# Patient Record
Sex: MALE | Race: WHITE | NOT HISPANIC OR LATINO | Employment: FULL TIME | URBAN - METROPOLITAN AREA
[De-identification: names, ages, dates, MRNs, and addresses within clinical notes are randomized per-mention and may not be internally consistent; named-entity substitution may affect disease eponyms.]

---

## 2020-12-21 PROCEDURE — U0004 COV-19 TEST NON-CDC HGH THRU: HCPCS | Performed by: PHYSICIAN ASSISTANT

## 2021-02-24 ENCOUNTER — LAB (OUTPATIENT)
Dept: LAB | Facility: HOSPITAL | Age: 41
End: 2021-02-24

## 2021-02-24 ENCOUNTER — OFFICE VISIT (OUTPATIENT)
Dept: ENDOCRINOLOGY | Facility: CLINIC | Age: 41
End: 2021-02-24

## 2021-02-24 VITALS
BODY MASS INDEX: 44.26 KG/M2 | OXYGEN SATURATION: 96 % | TEMPERATURE: 97.5 F | HEART RATE: 108 BPM | WEIGHT: 282 LBS | DIASTOLIC BLOOD PRESSURE: 74 MMHG | HEIGHT: 67 IN | SYSTOLIC BLOOD PRESSURE: 124 MMHG

## 2021-02-24 DIAGNOSIS — E23.0 HYPOGONADOTROPIC HYPOGONADISM (HCC): ICD-10-CM

## 2021-02-24 DIAGNOSIS — N62 GYNECOMASTIA: Primary | ICD-10-CM

## 2021-02-24 DIAGNOSIS — N62 GYNECOMASTIA: ICD-10-CM

## 2021-02-24 PROCEDURE — 83001 ASSAY OF GONADOTROPIN (FSH): CPT

## 2021-02-24 PROCEDURE — 84402 ASSAY OF FREE TESTOSTERONE: CPT

## 2021-02-24 PROCEDURE — 83002 ASSAY OF GONADOTROPIN (LH): CPT

## 2021-02-24 PROCEDURE — 84403 ASSAY OF TOTAL TESTOSTERONE: CPT

## 2021-02-24 PROCEDURE — 36415 COLL VENOUS BLD VENIPUNCTURE: CPT

## 2021-02-24 PROCEDURE — 82672 ASSAY OF ESTROGEN: CPT

## 2021-02-24 PROCEDURE — 99204 OFFICE O/P NEW MOD 45 MIN: CPT | Performed by: INTERNAL MEDICINE

## 2021-02-24 RX ORDER — IBUPROFEN 400 MG/1
400 TABLET ORAL EVERY 6 HOURS
COMMUNITY
End: 2021-05-06

## 2021-02-24 RX ORDER — OLMESARTAN MEDOXOMIL 40 MG/1
40 TABLET ORAL DAILY
COMMUNITY
End: 2022-11-17 | Stop reason: SDUPTHER

## 2021-02-24 RX ORDER — OMEPRAZOLE 20 MG/1
20 CAPSULE, DELAYED RELEASE ORAL DAILY
COMMUNITY
End: 2022-05-17

## 2021-02-24 RX ORDER — ROSUVASTATIN CALCIUM 40 MG/1
TABLET, COATED ORAL
COMMUNITY
Start: 2020-12-15

## 2021-02-24 RX ORDER — SILDENAFIL CITRATE 20 MG/1
20 TABLET ORAL 4 TIMES DAILY
COMMUNITY

## 2021-02-24 RX ORDER — FAMOTIDINE 20 MG/1
20 TABLET, FILM COATED ORAL DAILY
COMMUNITY
End: 2022-05-17

## 2021-02-24 RX ORDER — METOPROLOL SUCCINATE 50 MG/1
50 TABLET, EXTENDED RELEASE ORAL DAILY
COMMUNITY

## 2021-02-24 RX ORDER — LORATADINE 10 MG/1
10 TABLET ORAL
COMMUNITY

## 2021-02-24 NOTE — ASSESSMENT & PLAN NOTE
Has bilateral tender breasts. Suggests there might be too much estrogen. I suspect aromatization from testosterone in fat cells   Will check estrogen levels

## 2021-02-24 NOTE — ASSESSMENT & PLAN NOTE
Has clinical symptoms to suggest this and total testo was in the lower third of normal. Will check free level

## 2021-02-24 NOTE — PROGRESS NOTES
"     Office Note      Date: 2021  Patient Name: Terry Summers  MRN: [id number]  : 1980    Chief Complaint   Patient presents with   • Thyroid Problem   and breast pain    History of Present Illness:   Terry Summers is a 40 y.o. male who presents for Thyroid Problem  . And is euthyroid on meds.  But over the last year has  Gained 50 pounds. He notes breast swelling and tenderness. And decreased libido  tsh was normal.  testo was low normal  Cortisol was normal     Subjective      P  Review of Systems:   Review of Systems   Constitutional: Positive for fatigue and unexpected weight change.   Eyes: Negative.    Cardiovascular: Positive for chest pain.        Chest pain is actually breast pain   Endocrine: Negative.    Genitourinary: Negative.    Musculoskeletal: Negative.    Allergic/Immunologic: Negative.    Neurological: Negative.    Hematological: Negative.    Psychiatric/Behavioral: Negative.        The following portions of the patient's history were reviewed and updated as appropriate: allergies, current medications, past family history, past medical history, past social history, past surgical history and problem list.    Objective     Visit Vitals  /74 (BP Location: Left arm, Patient Position: Sitting, Cuff Size: Adult)   Pulse 108   Temp 97.5 °F (36.4 °C) (Infrared)   Ht 170.2 cm (67\")   Wt 128 kg (282 lb)   SpO2 96%   BMI 44.17 kg/m²           Physical Exam:  Physical Exam  Vitals signs reviewed.   Constitutional:       Appearance: Normal appearance. He is obese.   HENT:      Head: Normocephalic and atraumatic.      Mouth/Throat:      Mouth: Mucous membranes are moist.      Pharynx: Oropharynx is clear.   Eyes:      Extraocular Movements: Extraocular movements intact.      Pupils: Pupils are equal, round, and reactive to light.   Neck:      Musculoskeletal: Normal range of motion.      Comments: No goiter  Cardiovascular:      Rate and Rhythm: Normal rate and regular rhythm.      " Pulses: Normal pulses.      Heart sounds: No murmur.   Pulmonary:      Effort: Pulmonary effort is normal. No respiratory distress.   Chest:      Chest wall: Tenderness present.   Musculoskeletal: Normal range of motion.         General: No swelling.   Lymphadenopathy:      Cervical: No cervical adenopathy.   Skin:     General: Skin is warm.   Neurological:      General: No focal deficit present.      Mental Status: He is alert.   Psychiatric:         Mood and Affect: Mood normal.         Thought Content: Thought content normal.         Judgment: Judgment normal.         Assessment / Plan      Assessment & Plan:  Problem List Items Addressed This Visit        Other    Gynecomastia - Primary    Current Assessment & Plan     Has bilateral tender breasts. Suggests there might be too much estrogen. I suspect aromatization from testosterone in fat cells   Will check estrogen levels          Relevant Orders    Estrogens, Total    Hypogonadotropic hypogonadism (CMS/HCC)    Current Assessment & Plan     Has clinical symptoms to suggest this and total testo was in the lower third of normal. Will check free level          Relevant Orders    Testosterone, Free, Total    FSH & LH           Jose Bernal MD   02/24/2021

## 2021-02-25 LAB
FSH SERPL-ACNC: 2.37 MIU/ML
LH SERPL-ACNC: 2.95 MIU/ML

## 2021-02-27 LAB
TESTOST FREE SERPL-MCNC: 3 PG/ML (ref 6.8–21.5)
TESTOST SERPL-MCNC: 126 NG/DL (ref 264–916)

## 2021-03-01 LAB — ESTROGEN SERPL-MCNC: 124 PG/ML (ref 40–115)

## 2021-03-09 ENCOUNTER — TELEPHONE (OUTPATIENT)
Dept: ENDOCRINOLOGY | Facility: CLINIC | Age: 41
End: 2021-03-09

## 2021-03-09 NOTE — TELEPHONE ENCOUNTER
His labs shows high estrogen and low testosterone. I suspect that testosterone is being converted to estrogen in fat cells. I propose a trial of an aromatase inhibitor to try for 2 months to see if that helps. If he is willing to try that, I will send in an presciption and then he should follow up in 2 months

## 2021-03-09 NOTE — TELEPHONE ENCOUNTER
----- Message from Terry Summers sent at 3/9/2021 11:23 AM EST -----  Regarding: Test Results Question  Contact: [phone]  Hello,   I was wondering if I should make an appointment to follow-up with the recent test results, if I would need to get additional testing completed, or if I should wait for a phone call?  Thank You!  Osvaldo Summers

## 2021-03-09 NOTE — TELEPHONE ENCOUNTER
Patient notified.  He is agreeable to starting medication.  Uses [facility].  Appointment scheduled.

## 2021-03-10 RX ORDER — ANASTROZOLE 1 MG/1
1 TABLET ORAL DAILY
Qty: 30 TABLET | Refills: 5 | Status: SHIPPED | OUTPATIENT
Start: 2021-03-10 | End: 2021-08-03

## 2021-03-16 ENCOUNTER — APPOINTMENT (OUTPATIENT)
Dept: CT IMAGING | Facility: HOSPITAL | Age: 41
End: 2021-03-16

## 2021-03-16 ENCOUNTER — HOSPITAL ENCOUNTER (EMERGENCY)
Facility: HOSPITAL | Age: 41
Discharge: HOME | End: 2021-03-17
Attending: EMERGENCY MEDICINE | Admitting: EMERGENCY MEDICINE

## 2021-03-16 VITALS
DIASTOLIC BLOOD PRESSURE: 111 MMHG | RESPIRATION RATE: 18 BRPM | BODY MASS INDEX: 42.38 KG/M2 | TEMPERATURE: 99.4 F | HEIGHT: 67 IN | SYSTOLIC BLOOD PRESSURE: 157 MMHG | WEIGHT: 270 LBS | HEART RATE: 125 BPM | OXYGEN SATURATION: 94 %

## 2021-03-16 DIAGNOSIS — R10.30 LOWER ABDOMINAL PAIN: Primary | ICD-10-CM

## 2021-03-16 LAB
ALBUMIN SERPL-MCNC: 4.1 G/DL (ref 3.5–5.2)
ALBUMIN/GLOB SERPL: 1.3 G/DL
ALP SERPL-CCNC: 95 U/L (ref 39–117)
ALT SERPL W P-5'-P-CCNC: 132 U/L (ref 1–41)
ANION GAP SERPL CALCULATED.3IONS-SCNC: 10 MMOL/L (ref 5–15)
AST SERPL-CCNC: 154 U/L (ref 1–40)
BACTERIA UR QL AUTO: NORMAL /HPF
BASOPHILS # BLD AUTO: 0.06 10*3/MM3 (ref 0–0.2)
BASOPHILS NFR BLD AUTO: 0.7 % (ref 0–1.5)
BILIRUB SERPL-MCNC: 0.4 MG/DL (ref 0–1.2)
BILIRUB UR QL STRIP: NEGATIVE
BUN SERPL-MCNC: 15 MG/DL (ref 6–20)
BUN/CREAT SERPL: 12.2 (ref 7–25)
CALCIUM SPEC-SCNC: 9.9 MG/DL (ref 8.6–10.5)
CHLORIDE SERPL-SCNC: 101 MMOL/L (ref 98–107)
CK SERPL-CCNC: 240 U/L (ref 20–200)
CLARITY UR: CLEAR
CO2 SERPL-SCNC: 30 MMOL/L (ref 22–29)
COLOR UR: YELLOW
CREAT SERPL-MCNC: 1.23 MG/DL (ref 0.76–1.27)
D-LACTATE SERPL-SCNC: 1.7 MMOL/L (ref 0.5–2)
DEPRECATED RDW RBC AUTO: 37.3 FL (ref 37–54)
EOSINOPHIL # BLD AUTO: 0.39 10*3/MM3 (ref 0–0.4)
EOSINOPHIL NFR BLD AUTO: 4.8 % (ref 0.3–6.2)
ERYTHROCYTE [DISTWIDTH] IN BLOOD BY AUTOMATED COUNT: 13 % (ref 12.3–15.4)
GFR SERPL CREATININE-BSD FRML MDRD: 65 ML/MIN/1.73
GLOBULIN UR ELPH-MCNC: 3.2 GM/DL
GLUCOSE SERPL-MCNC: 118 MG/DL (ref 65–99)
GLUCOSE UR STRIP-MCNC: NEGATIVE MG/DL
HCT VFR BLD AUTO: 44 % (ref 37.5–51)
HGB BLD-MCNC: 15.1 G/DL (ref 13–17.7)
HGB UR QL STRIP.AUTO: ABNORMAL
HOLD SPECIMEN: NORMAL
HYALINE CASTS UR QL AUTO: NORMAL /LPF
IMM GRANULOCYTES # BLD AUTO: 0.02 10*3/MM3 (ref 0–0.05)
IMM GRANULOCYTES NFR BLD AUTO: 0.2 % (ref 0–0.5)
KETONES UR QL STRIP: NEGATIVE
LEUKOCYTE ESTERASE UR QL STRIP.AUTO: NEGATIVE
LIPASE SERPL-CCNC: 86 U/L (ref 13–60)
LYMPHOCYTES # BLD AUTO: 3.03 10*3/MM3 (ref 0.7–3.1)
LYMPHOCYTES NFR BLD AUTO: 37 % (ref 19.6–45.3)
MCH RBC QN AUTO: 28 PG (ref 26.6–33)
MCHC RBC AUTO-ENTMCNC: 34.3 G/DL (ref 31.5–35.7)
MCV RBC AUTO: 81.6 FL (ref 79–97)
MONOCYTES # BLD AUTO: 0.47 10*3/MM3 (ref 0.1–0.9)
MONOCYTES NFR BLD AUTO: 5.7 % (ref 5–12)
MYOGLOBIN SERPL-MCNC: 60 NG/ML (ref 28–72)
NEUTROPHILS NFR BLD AUTO: 4.22 10*3/MM3 (ref 1.7–7)
NEUTROPHILS NFR BLD AUTO: 51.6 % (ref 42.7–76)
NITRITE UR QL STRIP: NEGATIVE
NRBC BLD AUTO-RTO: 0 /100 WBC (ref 0–0.2)
PH UR STRIP.AUTO: <=5 [PH] (ref 5–8)
PLATELET # BLD AUTO: 294 10*3/MM3 (ref 140–450)
PMV BLD AUTO: 9 FL (ref 6–12)
POTASSIUM SERPL-SCNC: 3.8 MMOL/L (ref 3.5–5.2)
PROCALCITONIN SERPL-MCNC: 0.3 NG/ML (ref 0–0.25)
PROT SERPL-MCNC: 7.3 G/DL (ref 6–8.5)
PROT UR QL STRIP: NEGATIVE
RBC # BLD AUTO: 5.39 10*6/MM3 (ref 4.14–5.8)
RBC # UR: NORMAL /HPF
REF LAB TEST METHOD: NORMAL
SARS-COV-2 RNA RESP QL NAA+PROBE: NOT DETECTED
SODIUM SERPL-SCNC: 141 MMOL/L (ref 136–145)
SP GR UR STRIP: 1.02 (ref 1–1.03)
SQUAMOUS #/AREA URNS HPF: NORMAL /HPF
UROBILINOGEN UR QL STRIP: ABNORMAL
WBC # BLD AUTO: 8.19 10*3/MM3 (ref 3.4–10.8)
WBC UR QL AUTO: NORMAL /HPF
WHOLE BLOOD HOLD SPECIMEN: NORMAL
WHOLE BLOOD HOLD SPECIMEN: NORMAL

## 2021-03-16 PROCEDURE — 25010000002 IOPAMIDOL 61 % SOLUTION: Performed by: EMERGENCY MEDICINE

## 2021-03-16 PROCEDURE — 87040 BLOOD CULTURE FOR BACTERIA: CPT | Performed by: EMERGENCY MEDICINE

## 2021-03-16 PROCEDURE — U0003 INFECTIOUS AGENT DETECTION BY NUCLEIC ACID (DNA OR RNA); SEVERE ACUTE RESPIRATORY SYNDROME CORONAVIRUS 2 (SARS-COV-2) (CORONAVIRUS DISEASE [COVID-19]), AMPLIFIED PROBE TECHNIQUE, MAKING USE OF HIGH THROUGHPUT TECHNOLOGIES AS DESCRIBED BY CMS-2020-01-R: HCPCS | Performed by: EMERGENCY MEDICINE

## 2021-03-16 PROCEDURE — 83690 ASSAY OF LIPASE: CPT | Performed by: EMERGENCY MEDICINE

## 2021-03-16 PROCEDURE — 82550 ASSAY OF CK (CPK): CPT | Performed by: EMERGENCY MEDICINE

## 2021-03-16 PROCEDURE — 84145 PROCALCITONIN (PCT): CPT | Performed by: EMERGENCY MEDICINE

## 2021-03-16 PROCEDURE — 83605 ASSAY OF LACTIC ACID: CPT | Performed by: EMERGENCY MEDICINE

## 2021-03-16 PROCEDURE — 74177 CT ABD & PELVIS W/CONTRAST: CPT

## 2021-03-16 PROCEDURE — 85025 COMPLETE CBC W/AUTO DIFF WBC: CPT | Performed by: EMERGENCY MEDICINE

## 2021-03-16 PROCEDURE — 80053 COMPREHEN METABOLIC PANEL: CPT | Performed by: EMERGENCY MEDICINE

## 2021-03-16 PROCEDURE — 81001 URINALYSIS AUTO W/SCOPE: CPT | Performed by: EMERGENCY MEDICINE

## 2021-03-16 PROCEDURE — 83874 ASSAY OF MYOGLOBIN: CPT | Performed by: EMERGENCY MEDICINE

## 2021-03-16 PROCEDURE — 99283 EMERGENCY DEPT VISIT LOW MDM: CPT

## 2021-03-16 RX ORDER — SODIUM CHLORIDE 0.9 % (FLUSH) 0.9 %
10 SYRINGE (ML) INJECTION AS NEEDED
Status: DISCONTINUED | OUTPATIENT
Start: 2021-03-16 | End: 2021-03-17 | Stop reason: HOSPADM

## 2021-03-16 RX ORDER — DICYCLOMINE HCL 20 MG
20 TABLET ORAL EVERY 6 HOURS PRN
Qty: 20 TABLET | Refills: 0 | Status: SHIPPED | OUTPATIENT
Start: 2021-03-16 | End: 2021-05-06

## 2021-03-16 RX ADMIN — SODIUM CHLORIDE, POTASSIUM CHLORIDE, SODIUM LACTATE AND CALCIUM CHLORIDE 1000 ML: 600; 310; 30; 20 INJECTION, SOLUTION INTRAVENOUS at 22:11

## 2021-03-16 RX ADMIN — IOPAMIDOL 90 ML: 612 INJECTION, SOLUTION INTRAVENOUS at 22:57

## 2021-03-17 NOTE — ED PROVIDER NOTES
Subjective   Pt presents with lower abdominal pain.  Onset about two days ago.  Bandlike around lower abdomen radiating to the back>  Worse with movenent and breathing.  He has been a little sweaty and has mild nausea but no fever, chills, vomiting, diarrhea or urinary complaints.  He has not had recent antibiotics or exposure to untreated water.  He has never had an abdominal surgery.      History provided by:  Patient      Review of Systems   Constitutional: Negative for chills and fever.   Respiratory: Negative for shortness of breath.    Cardiovascular: Negative for chest pain.   Gastrointestinal: Positive for abdominal pain. Negative for diarrhea and vomiting.   Genitourinary: Negative for difficulty urinating and dysuria.   All other systems reviewed and are negative.      Past Medical History:   Diagnosis Date   • Asthma    • Depression    • Disease of thyroid gland    • Hyperlipidemia    • Hypertension        Allergies   Allergen Reactions   • Lisinopril Cough   • Sulfur Hives       Past Surgical History:   Procedure Laterality Date   • ADENOIDECTOMY     • DENTAL PROCEDURE     • LASIK     • NASAL SEPTUM SURGERY     • TONSILLECTOMY     • VASECTOMY         Family History   Problem Relation Age of Onset   • Diabetes Mother    • Obesity Mother    • Hypertension Father    • Hyperlipidemia Father    • Heart attack Father        Social History     Socioeconomic History   • Marital status:      Spouse name: Not on file   • Number of children: Not on file   • Years of education: Not on file   • Highest education level: Not on file   Tobacco Use   • Smoking status: Never Smoker   • Smokeless tobacco: Never Used   Vaping Use   • Vaping Use: Never used   Substance and Sexual Activity   • Alcohol use: Not Currently   • Drug use: Never   • Sexual activity: Defer           Objective   Physical Exam  Vitals and nursing note reviewed.   Constitutional:       General: He is not in acute distress.     Appearance: Normal  "appearance. He is obese. He is diaphoretic. He is not ill-appearing.   HENT:      Head: Normocephalic and atraumatic.      Mouth/Throat:      Mouth: Mucous membranes are moist.   Eyes:      General: No scleral icterus.        Right eye: No discharge.         Left eye: No discharge.      Conjunctiva/sclera: Conjunctivae normal.   Cardiovascular:      Rate and Rhythm: Normal rate and regular rhythm.      Heart sounds: No murmur.   Pulmonary:      Effort: Pulmonary effort is normal. No respiratory distress.      Breath sounds: Normal breath sounds. No wheezing.   Abdominal:      General: Bowel sounds are normal. There is no distension.      Palpations: Abdomen is soft.      Tenderness: There is abdominal tenderness in the right lower quadrant. There is no guarding or rebound.   Musculoskeletal:         General: No swelling. Normal range of motion.      Cervical back: Normal range of motion and neck supple.   Skin:     General: Skin is warm.      Findings: No rash.   Neurological:      General: No focal deficit present.      Mental Status: He is alert. Mental status is at baseline.   Psychiatric:         Mood and Affect: Mood normal.         Behavior: Behavior normal.         Thought Content: Thought content normal.         Procedures           ED Course         Labs, UA benign.  CT negative for acute process. No clear cause for symptoms found.    Patient stable on serial rechecks.  Discussed findings, concerns, plan of care, expected course, reasons to return and followup.  Provided the opportunity to ask questions.    Discussed \"watchful waiting\" over the next 2-3 days, monitoring for new or evolving symptoms which may help elucidate a cause, close outpatient followup, and return to ED for any concerning worsening or changing.                                    MDM  Number of Diagnoses or Management Options     Amount and/or Complexity of Data Reviewed  Clinical lab tests: reviewed and ordered  Tests in the radiology " section of CPT®: reviewed and ordered  Independent visualization of images, tracings, or specimens: yes        Final diagnoses:   Lower abdominal pain       ED Disposition  ED Disposition     ED Disposition Condition Comment    Discharge Stable           [facility] Emergency Department  [address] [address] [phone]    If symptoms worsen    Mi Masterson PA  [address] [address]  [phone]    In 2 days           Medication List      New Prescriptions    dicyclomine 20 MG tablet  Commonly known as: BENTYL  Take 1 tablet by mouth Every 6 (Six) Hours As Needed (pain).           Where to Get Your Medications      These medications were sent to [facility] [facility] - [phone]  - [phone]   [address] [place] [address]    Phone: [phone]   · dicyclomine 20 MG tablet          Velasquez Esteban MD  03/16/21 [id number]

## 2021-03-22 LAB
BACTERIA SPEC AEROBE CULT: NORMAL
BACTERIA SPEC AEROBE CULT: NORMAL

## 2021-05-06 ENCOUNTER — OFFICE VISIT (OUTPATIENT)
Dept: ORTHOPEDIC SURGERY | Facility: CLINIC | Age: 41
End: 2021-05-06

## 2021-05-06 VITALS
SYSTOLIC BLOOD PRESSURE: 145 MMHG | DIASTOLIC BLOOD PRESSURE: 101 MMHG | BODY MASS INDEX: 43.47 KG/M2 | HEART RATE: 90 BPM | WEIGHT: 277 LBS | HEIGHT: 67 IN

## 2021-05-06 DIAGNOSIS — M25.531 RIGHT WRIST PAIN: Primary | ICD-10-CM

## 2021-05-06 DIAGNOSIS — M65.4 RADIAL STYLOID TENOSYNOVITIS (DE QUERVAIN): ICD-10-CM

## 2021-05-06 PROCEDURE — 20550 NJX 1 TENDON SHEATH/LIGAMENT: CPT | Performed by: PHYSICIAN ASSISTANT

## 2021-05-06 PROCEDURE — 99204 OFFICE O/P NEW MOD 45 MIN: CPT | Performed by: PHYSICIAN ASSISTANT

## 2021-05-06 RX ADMIN — LIDOCAINE HYDROCHLORIDE 1 ML: 10 INJECTION, SOLUTION INFILTRATION; PERINEURAL at 15:07

## 2021-05-06 RX ADMIN — TRIAMCINOLONE ACETONIDE 20 MG: 40 INJECTION, SUSPENSION INTRA-ARTICULAR; INTRAMUSCULAR at 15:07

## 2021-05-10 RX ORDER — LIDOCAINE HYDROCHLORIDE 10 MG/ML
1 INJECTION, SOLUTION INFILTRATION; PERINEURAL
Status: COMPLETED | OUTPATIENT
Start: 2021-05-06 | End: 2021-05-06

## 2021-05-10 RX ORDER — TRIAMCINOLONE ACETONIDE 40 MG/ML
20 INJECTION, SUSPENSION INTRA-ARTICULAR; INTRAMUSCULAR
Status: COMPLETED | OUTPATIENT
Start: 2021-05-06 | End: 2021-05-06

## 2021-05-20 ENCOUNTER — LAB (OUTPATIENT)
Dept: LAB | Facility: HOSPITAL | Age: 41
End: 2021-05-20

## 2021-05-20 ENCOUNTER — OFFICE VISIT (OUTPATIENT)
Dept: ENDOCRINOLOGY | Facility: CLINIC | Age: 41
End: 2021-05-20

## 2021-05-20 VITALS
HEIGHT: 67 IN | BODY MASS INDEX: 44.39 KG/M2 | WEIGHT: 282.8 LBS | DIASTOLIC BLOOD PRESSURE: 84 MMHG | SYSTOLIC BLOOD PRESSURE: 130 MMHG

## 2021-05-20 DIAGNOSIS — N62 GYNECOMASTIA: ICD-10-CM

## 2021-05-20 DIAGNOSIS — N62 GYNECOMASTIA: Primary | ICD-10-CM

## 2021-05-20 PROCEDURE — 84402 ASSAY OF FREE TESTOSTERONE: CPT

## 2021-05-20 PROCEDURE — 99213 OFFICE O/P EST LOW 20 MIN: CPT | Performed by: INTERNAL MEDICINE

## 2021-05-20 PROCEDURE — 82672 ASSAY OF ESTROGEN: CPT

## 2021-05-20 PROCEDURE — 84403 ASSAY OF TOTAL TESTOSTERONE: CPT

## 2021-05-20 PROCEDURE — 36415 COLL VENOUS BLD VENIPUNCTURE: CPT

## 2021-05-20 RX ORDER — LIFITEGRAST 50 MG/ML
SOLUTION/ DROPS OPHTHALMIC AS NEEDED
COMMUNITY
Start: 2021-04-06 | End: 2022-05-17

## 2021-05-20 NOTE — PROGRESS NOTES
"     Office Note      Date: 2021  Patient Name: Terry Summers  MRN: [id number]  : 1980    Chief Complaint   Patient presents with   • Follow-up     gynemastia        History of Present Illness:   Terry Summers is a 40 y.o. male who presents for Follow-up (gynemastia )  he had high estrogen and low t. I put him on anastroxole. The breast tenderness is gone.  Energy is the same.  Libido about the same.  No change in need for viagra  No problems with the medication .    Subjective      .    Review of Systems:   Review of Systems   Constitutional: Positive for fatigue and unexpected weight change.       The following portions of the patient's history were reviewed and updated as appropriate: allergies, current medications, past family history, past medical history, past social history, past surgical history and problem list.    Objective     Visit Vitals  /84   Ht 170.2 cm (67\")   Wt 128 kg (282 lb 12.8 oz)   BMI 44.29 kg/m²       Labs:    CBC w/DIFF  Lab Results   Component Value Date    WBC 8.19 2021    RBC 5.39 2021    HGB 15.1 2021    HCT 44.0 2021    MCV 81.6 2021    MCH 28.0 2021    MCHC 34.3 2021    RDW 13.0 2021    RDWSD 37.3 2021    MPV 9.0 2021     2021    NEUTRORELPCT 51.6 2021    LYMPHORELPCT 37.0 2021    MONORELPCT 5.7 2021    EOSRELPCT 4.8 2021    BASORELPCT 0.7 2021    AUTOIGPER 0.2 2021    NEUTROABS 4.22 2021    LYMPHSABS 3.03 2021    MONOSABS 0.47 2021    EOSABS 0.39 2021    BASOSABS 0.06 2021    AUTOIGNUM 0.02 2021    NRBC 0.0 2021       T4  No results found for: FREET4    TSH  No results found for: TSHBASE     Physical Exam:  Physical Exam  Musculoskeletal:      Comments: No breast tenderness          Assessment / Plan      Assessment & Plan:  Problem List Items Addressed This Visit        Other    Gynecomastia - Primary    " Current Assessment & Plan     Clinically improved. I need to see his levels to guide therapy          Relevant Orders    Testosterone, Free, Total    Estrogens, Total           Jose Bernal MD   05/20/2021

## 2021-05-26 LAB
TESTOST FREE SERPL-MCNC: 7.1 PG/ML (ref 6.8–21.5)
TESTOST SERPL-MCNC: 527 NG/DL (ref 264–916)

## 2021-05-27 LAB — ESTROGEN SERPL-MCNC: 43 PG/ML (ref 40–115)

## 2021-08-03 RX ORDER — ANASTROZOLE 1 MG/1
TABLET ORAL
Qty: 90 TABLET | Refills: 0 | Status: SHIPPED | OUTPATIENT
Start: 2021-08-03 | End: 2021-10-20

## 2021-10-20 RX ORDER — ANASTROZOLE 1 MG/1
TABLET ORAL
Qty: 90 TABLET | Refills: 0 | Status: SHIPPED | OUTPATIENT
Start: 2021-10-20 | End: 2021-11-23 | Stop reason: SDUPTHER

## 2021-11-22 ENCOUNTER — OFFICE VISIT (OUTPATIENT)
Dept: ENDOCRINOLOGY | Facility: CLINIC | Age: 41
End: 2021-11-22

## 2021-11-22 ENCOUNTER — LAB (OUTPATIENT)
Dept: LAB | Facility: HOSPITAL | Age: 41
End: 2021-11-22

## 2021-11-22 VITALS
DIASTOLIC BLOOD PRESSURE: 80 MMHG | HEART RATE: 98 BPM | SYSTOLIC BLOOD PRESSURE: 124 MMHG | WEIGHT: 277 LBS | BODY MASS INDEX: 43.47 KG/M2 | HEIGHT: 67 IN | OXYGEN SATURATION: 96 %

## 2021-11-22 DIAGNOSIS — E23.0 HYPOGONADOTROPIC HYPOGONADISM (HCC): Primary | ICD-10-CM

## 2021-11-22 DIAGNOSIS — E23.0 HYPOGONADOTROPIC HYPOGONADISM (HCC): ICD-10-CM

## 2021-11-22 DIAGNOSIS — E03.9 ACQUIRED HYPOTHYROIDISM: ICD-10-CM

## 2021-11-22 PROBLEM — N62 GYNECOMASTIA: Status: RESOLVED | Noted: 2021-02-24 | Resolved: 2021-11-22

## 2021-11-22 LAB
ESTRADIOL SERPL HS-MCNC: <5 PG/ML
TESTOST SERPL-MCNC: 522 NG/DL (ref 249–836)
TSH SERPL DL<=0.05 MIU/L-ACNC: 0.36 UIU/ML (ref 0.27–4.2)

## 2021-11-22 PROCEDURE — 82670 ASSAY OF TOTAL ESTRADIOL: CPT

## 2021-11-22 PROCEDURE — 99214 OFFICE O/P EST MOD 30 MIN: CPT | Performed by: INTERNAL MEDICINE

## 2021-11-22 PROCEDURE — 84443 ASSAY THYROID STIM HORMONE: CPT

## 2021-11-22 PROCEDURE — 36415 COLL VENOUS BLD VENIPUNCTURE: CPT

## 2021-11-22 PROCEDURE — 84403 ASSAY OF TOTAL TESTOSTERONE: CPT

## 2021-11-22 RX ORDER — METFORMIN HYDROCHLORIDE 500 MG/1
500 TABLET, EXTENDED RELEASE ORAL 2 TIMES DAILY
COMMUNITY
Start: 2021-10-20 | End: 2022-11-17 | Stop reason: SDUPTHER

## 2021-11-22 RX ORDER — DEXTROAMPHETAMINE SACCHARATE, AMPHETAMINE ASPARTATE, DEXTROAMPHETAMINE SULFATE AND AMPHETAMINE SULFATE 2.5; 2.5; 2.5; 2.5 MG/1; MG/1; MG/1; MG/1
10 TABLET ORAL 2 TIMES DAILY
COMMUNITY
Start: 2021-11-11 | End: 2022-11-17

## 2021-11-22 NOTE — PROGRESS NOTES
"     Office Note      Date: 2021  Patient Name: Terry Summers  MRN: [id number]  : 1980    Chief Complaint   Patient presents with   • Hypogonadism       History of Present Illness:   Terry Summers is a 41 y.o. male who presents for Hypogonadism   he has high estrogen with low T.. presumably this is due to fat cells converting T to E.. I have him on an aromatase inhibitor- and last time his E was normal and his T was normal  He feels ok .  No breast pain or swelling.  He is due for a tsh check- he has primary hypothryoidism and is on T4 at 25 mcg per day. He has no symptoms     Subjective        Review of Systems:   Review of Systems   Constitutional: Positive for unexpected weight change.       The following portions of the patient's history were reviewed and updated as appropriate: allergies, current medications, past family history, past medical history, past social history, past surgical history and problem list.    Objective     Visit Vitals  /80   Pulse 98   Ht 170.2 cm (67\")   Wt 126 kg (277 lb)   SpO2 96%   BMI 43.38 kg/m²       Labs:    CBC w/DIFF  Lab Results   Component Value Date    WBC 8.19 2021    RBC 5.39 2021    HGB 15.1 2021    HCT 44.0 2021    MCV 81.6 2021    MCH 28.0 2021    MCHC 34.3 2021    RDW 13.0 2021    RDWSD 37.3 2021    MPV 9.0 2021     2021    NEUTRORELPCT 51.6 2021    LYMPHORELPCT 37.0 2021    MONORELPCT 5.7 2021    EOSRELPCT 4.8 2021    BASORELPCT 0.7 2021    AUTOIGPER 0.2 2021    NEUTROABS 4.22 2021    LYMPHSABS 3.03 2021    MONOSABS 0.47 2021    EOSABS 0.39 2021    BASOSABS 0.06 2021    AUTOIGNUM 0.02 2021    NRBC 0.0 2021       T4  No results found for: FREET4    TSH  No results found for: TSHBASE     Physical Exam:  Physical Exam  Vitals reviewed.   Constitutional:       Appearance: He is obese.   HENT:    "   Head: Normocephalic and atraumatic.   Neck:      Comments: No goiter  Lymphadenopathy:      Cervical: No cervical adenopathy.   Neurological:      Mental Status: He is alert.   Psychiatric:         Mood and Affect: Mood normal.         Thought Content: Thought content normal.         Judgment: Judgment normal.         Assessment / Plan      Assessment & Plan:  Problem List Items Addressed This Visit        Other    Hypogonadotropic hypogonadism (HCC) - Primary    Current Assessment & Plan     Stable. Will check labs and look to taper arimadex          Relevant Orders    Estradiol    Testosterone    Acquired hypothyroidism    Current Assessment & Plan     Clinically euthyroid. Thyroid levels ordered. Medication to be adjusted accordingly.         Relevant Medications    levothyroxine (SYNTHROID, LEVOTHROID) 25 MCG tablet    metoprolol succinate XL (TOPROL-XL) 50 MG 24 hr tablet    Other Relevant Orders    TSH           Jose Bernal MD   11/22/2021

## 2021-11-23 RX ORDER — LEVOTHYROXINE SODIUM 0.03 MG/1
25 TABLET ORAL DAILY
Qty: 90 TABLET | Refills: 3 | Status: SHIPPED | OUTPATIENT
Start: 2021-11-23 | End: 2022-11-17 | Stop reason: SDUPTHER

## 2021-11-23 RX ORDER — ANASTROZOLE 1 MG/1
TABLET ORAL
Qty: 45 TABLET | Refills: 1 | Status: SHIPPED | OUTPATIENT
Start: 2021-11-23 | End: 2022-01-04 | Stop reason: SDUPTHER

## 2022-01-04 RX ORDER — ANASTROZOLE 1 MG/1
TABLET ORAL
Qty: 45 TABLET | Refills: 1 | Status: SHIPPED | OUTPATIENT
Start: 2022-01-04 | End: 2022-01-12

## 2022-01-12 RX ORDER — ANASTROZOLE 1 MG/1
TABLET ORAL
Qty: 45 TABLET | Refills: 1 | Status: SHIPPED | OUTPATIENT
Start: 2022-01-12 | End: 2022-05-09

## 2022-05-09 RX ORDER — ANASTROZOLE 1 MG/1
TABLET ORAL
Qty: 90 TABLET | Refills: 1 | Status: SHIPPED | OUTPATIENT
Start: 2022-05-09 | End: 2022-10-25

## 2022-05-17 ENCOUNTER — OFFICE VISIT (OUTPATIENT)
Dept: ENDOCRINOLOGY | Facility: CLINIC | Age: 42
End: 2022-05-17

## 2022-05-17 ENCOUNTER — LAB (OUTPATIENT)
Dept: LAB | Facility: HOSPITAL | Age: 42
End: 2022-05-17

## 2022-05-17 VITALS
OXYGEN SATURATION: 97 % | SYSTOLIC BLOOD PRESSURE: 120 MMHG | WEIGHT: 265.6 LBS | HEART RATE: 102 BPM | DIASTOLIC BLOOD PRESSURE: 86 MMHG | HEIGHT: 67 IN | BODY MASS INDEX: 41.69 KG/M2

## 2022-05-17 DIAGNOSIS — E23.0 HYPOGONADOTROPIC HYPOGONADISM: Primary | ICD-10-CM

## 2022-05-17 DIAGNOSIS — E23.0 HYPOGONADOTROPIC HYPOGONADISM: ICD-10-CM

## 2022-05-17 LAB
ESTRADIOL SERPL HS-MCNC: 5.2 PG/ML
TESTOST SERPL-MCNC: 467 NG/DL (ref 249–836)

## 2022-05-17 PROCEDURE — 99213 OFFICE O/P EST LOW 20 MIN: CPT | Performed by: INTERNAL MEDICINE

## 2022-05-17 PROCEDURE — 36415 COLL VENOUS BLD VENIPUNCTURE: CPT

## 2022-05-17 PROCEDURE — 82670 ASSAY OF TOTAL ESTRADIOL: CPT

## 2022-05-17 PROCEDURE — 84403 ASSAY OF TOTAL TESTOSTERONE: CPT

## 2022-05-17 RX ORDER — SEMAGLUTIDE 1.34 MG/ML
INJECTION, SOLUTION SUBCUTANEOUS
COMMUNITY
Start: 2022-04-29 | End: 2022-11-17

## 2022-05-17 RX ORDER — OMEGA-3-ACID ETHYL ESTERS 1 G/1
2 CAPSULE, LIQUID FILLED ORAL 2 TIMES DAILY
COMMUNITY
Start: 2022-04-25

## 2022-05-17 NOTE — PROGRESS NOTES
"     Office Note      Date: 2022  Patient Name: Terry Summers  MRN: [id number]  : 1980    Chief Complaint   Patient presents with   • Hypogonadism       History of Present Illness:   Terry Summers is a 41 y.o. male who presents for Hypogonadism  he presented with tender gynecomastia. High E2 and low T.  I treated with aromatase inhibitor. E2 dropped, T went up and symptoms abated.  Last time, I reduced his medication  He notes no changes.  He is on ozempic due to high blood sugar and has lost weight.  He feels better than he has in a long time.     Subjective          Review of Systems:   Review of Systems   Constitutional: Negative for fatigue and unexpected weight change.       The following portions of the patient's history were reviewed and updated as appropriate: allergies, current medications, past family history, past medical history, past social history, past surgical history and problem list.    Objective     Visit Vitals  /86   Pulse 102   Ht 170.2 cm (67\")   Wt 120 kg (265 lb 9.6 oz)   SpO2 97%   BMI 41.60 kg/m²       Labs:    CBC w/DIFF  Lab Results   Component Value Date    WBC 8.19 2021    RBC 5.39 2021    HGB 15.1 2021    HCT 44.0 2021    MCV 81.6 2021    MCH 28.0 2021    MCHC 34.3 2021    RDW 13.0 2021    RDWSD 37.3 2021    MPV 9.0 2021     2021    NEUTRORELPCT 51.6 2021    LYMPHORELPCT 37.0 2021    MONORELPCT 5.7 2021    EOSRELPCT 4.8 2021    BASORELPCT 0.7 2021    AUTOIGPER 0.2 2021    NEUTROABS 4.22 2021    LYMPHSABS 3.03 2021    MONOSABS 0.47 2021    EOSABS 0.39 2021    BASOSABS 0.06 2021    AUTOIGNUM 0.02 2021    NRBC 0.0 2021       T4  No results found for: FREET4    TSH  No results found for: TSHBASE     Physical Exam:  Physical Exam  Vitals reviewed.   Constitutional:       Appearance: Normal appearance.   HENT:     "  Head: Normocephalic and atraumatic.   Neurological:      Mental Status: He is alert.   Psychiatric:         Mood and Affect: Mood normal.         Thought Content: Thought content normal.         Judgment: Judgment normal.         Assessment / Plan      Assessment & Plan:  Problem List Items Addressed This Visit        Other    Hypogonadotropic hypogonadism (HCC) - Primary    Current Assessment & Plan     Improved.   If labs show continued improvement, I will reduce or stop the arimedex.  The weight loss often fixes this issue           Relevant Orders    Estradiol    Testosterone           Jose Bernal MD   05/17/2022

## 2022-05-17 NOTE — ASSESSMENT & PLAN NOTE
Improved.   If labs show continued improvement, I will reduce or stop the arimedex.  The weight loss often fixes this issue

## 2022-10-25 RX ORDER — ANASTROZOLE 1 MG/1
TABLET ORAL
Qty: 90 TABLET | Refills: 0 | Status: SHIPPED | OUTPATIENT
Start: 2022-10-25 | End: 2022-11-17 | Stop reason: SDUPTHER

## 2022-11-17 ENCOUNTER — OFFICE VISIT (OUTPATIENT)
Dept: ENDOCRINOLOGY | Facility: CLINIC | Age: 42
End: 2022-11-17

## 2022-11-17 VITALS
BODY MASS INDEX: 41.72 KG/M2 | OXYGEN SATURATION: 96 % | SYSTOLIC BLOOD PRESSURE: 122 MMHG | HEART RATE: 110 BPM | HEIGHT: 67 IN | DIASTOLIC BLOOD PRESSURE: 88 MMHG | WEIGHT: 265.8 LBS

## 2022-11-17 DIAGNOSIS — E23.0 HYPOGONADOTROPIC HYPOGONADISM: Primary | ICD-10-CM

## 2022-11-17 DIAGNOSIS — E03.9 ACQUIRED HYPOTHYROIDISM: ICD-10-CM

## 2022-11-17 PROCEDURE — 99213 OFFICE O/P EST LOW 20 MIN: CPT | Performed by: INTERNAL MEDICINE

## 2022-11-17 RX ORDER — ANASTROZOLE 1 MG/1
1 TABLET ORAL DAILY
COMMUNITY
End: 2022-11-17

## 2022-11-17 RX ORDER — SEMAGLUTIDE 1.34 MG/ML
INJECTION, SOLUTION SUBCUTANEOUS
COMMUNITY
Start: 2022-10-27

## 2022-11-17 RX ORDER — DEXTROAMPHETAMINE SULFATE 10 MG/1
10 TABLET ORAL EVERY 12 HOURS SCHEDULED
COMMUNITY

## 2022-11-17 RX ORDER — OLMESARTAN MEDOXOMIL 40 MG/1
40 TABLET ORAL DAILY
COMMUNITY

## 2022-11-17 RX ORDER — LEVOTHYROXINE SODIUM 0.03 MG/1
25 TABLET ORAL DAILY
Qty: 90 TABLET | Refills: 3 | Status: SHIPPED | OUTPATIENT
Start: 2022-11-17

## 2022-11-17 NOTE — PROGRESS NOTES
"     Office Note      Date: 2022  Patient Name: Terry Summers  MRN: [id number]  : 1980    Chief Complaint   Patient presents with   • Hypogonadism       History of Present Illness:   Terry Summers is a 42 y.o. male who presents for Hypogonadism  ================  He has low T due to excessive conversion of T to E in fat cells. Anastrozole fixed the issue.  He has been on ozempic for weight.  Wt has been stable  He has not had breast pain or tenderness and has been doing well.     Subjective          Review of Systems:   Review of Systems   Constitutional: Negative.    HENT: Negative.        The following portions of the patient's history were reviewed and updated as appropriate: allergies, current medications, past family history, past medical history, past social history, past surgical history and problem list.    Objective     Visit Vitals  /88   Pulse 110   Ht 170.2 cm (67\")   Wt 121 kg (265 lb 12.8 oz)   SpO2 96%   BMI 41.63 kg/m²       Labs:    CBC w/DIFF  Lab Results   Component Value Date    WBC 8.19 2021    RBC 5.39 2021    HGB 15.1 2021    HCT 44.0 2021    MCV 81.6 2021    MCH 28.0 2021    MCHC 34.3 2021    RDW 13.0 2021    RDWSD 37.3 2021    MPV 9.0 2021     2021    NEUTRORELPCT 51.6 2021    LYMPHORELPCT 37.0 2021    MONORELPCT 5.7 2021    EOSRELPCT 4.8 2021    BASORELPCT 0.7 2021    AUTOIGPER 0.2 2021    NEUTROABS 4.22 2021    LYMPHSABS 3.03 2021    MONOSABS 0.47 2021    EOSABS 0.39 2021    BASOSABS 0.06 2021    AUTOIGNUM 0.02 2021    NRBC 0.0 2021       T4  Free T4   Date Value Ref Range Status   2022 0.8 0.8 - 1.7 ng/dL Final       TSH  No results found for: TSHBASE     Physical Exam:  Physical Exam  Vitals reviewed.   Constitutional:       Appearance: Normal appearance.   Neurological:      Mental Status: He is alert. "   Psychiatric:         Mood and Affect: Mood normal.         Thought Content: Thought content normal.         Judgment: Judgment normal.         Assessment / Plan      Assessment & Plan:  Problem List Items Addressed This Visit        Other    Hypogonadotropic hypogonadism (HCC) - Primary    Current Assessment & Plan     Improved. The plan is to stop treatment and recheck labs in 6 weeks to see if he still needs it         Relevant Orders    Estradiol    Testosterone    Acquired hypothyroidism    Current Assessment & Plan     Stable.  Clinically euthyroid. Thyroid levels ordered. Medication to be adjusted accordingly.         Relevant Medications    metoprolol succinate XL (TOPROL-XL) 50 MG 24 hr tablet    levothyroxine (SYNTHROID, LEVOTHROID) 25 MCG tablet    Other Relevant Orders    TSH        Jose Bernal MD   11/17/2022

## 2023-01-17 ENCOUNTER — E-VISIT (OUTPATIENT)
Dept: ADMINISTRATIVE | Facility: OTHER | Age: 43
End: 2023-01-17

## 2023-01-17 NOTE — E-VISIT ESCALATED
Chief Complaint: Constipation   Patient was shown the following escalation message:   Some conditions need a visit with a healthcare provider   Because you need help with every bowel movement, you should speak with a provider to get care.   ----------   Patient Interview Transcript:   Which symptoms are you having? Select all that apply.    Hard or lumpy stools    Straining with bowel movements    Feeling as though a blockage in my rectum is preventing a bowel movement    Feeling as though I can't completely empty my bowels    Abdominal pain or discomfort    Bloating    Swollen or distended abdomen   Not selected:    Fewer than 3 stools per week    Stools that are too small   Which symptom is bothering you the most? Select one.    Feeling as though I can't completely empty my bowels   Not selected:    Abdominal pain or discomfort    Bloating    Swollen or distended abdomen    Fewer than 3 stools per week    Hard or lumpy stools    Stools that are too small    Straining with bowel movements    Feeling as though a blockage in my rectum is preventing a bowel movement   How long have you had your symptoms? Select one.    1 to 2 weeks   Not selected:    Less than 1 week    2 to 4 weeks    More than 1 month but less than 3 months    More than 3 months    I'm not sure   Are your symptoms constant, or do they come and go? For example, have you had normal stools in between the times you had hard stools? Are there times when you don't need to strain to have a bowel movement? Select one.    Constant   Not selected:    Come and go   Do you strain to pass both hard and soft stools? Select one.    No, just hard stools   Not selected:    Yes, both   When was the last time you passed any stool at all? Select one.    Today   Not selected:    Yesterday    2 days ago    3 days ago    More than 3 days ago   On average, how many times a week do you have a bowel movement? Select one.    Every other day   Not selected:    Once a week     Twice a week    3 times per week    Once a day    More than 1 time per day   How often do you feel the need or urge to have a bowel movement? Select one.    Less than 3 times per week   Not selected:    Every other day    Once a day    More than once a day   When you feel the need, how often do you immediately try to have a bowel movement? Select one.    Always   Not selected:    More than half the time    Less than half the time    Rarely or never   When you feel the need and then try to have a bowel movement, how often are you able to pass stool? Report passing any stool, even if it's a small amount. Select one.    Less than half the time   Not selected:    Always    More than half the time    Rarely or never   When you try to have a bowel movement, how often do you feel as if you've completely emptied your bowels? Select one.    Less than half the time   Not selected:    Always    More than half the time    Rarely or never   Do you ever need help having a bowel movement, such as using a finger to remove the stool from your rectum? Select one.    Yes   Not selected:    No   Do you need help with every bowel movement? Select one.    Yes   Not selected:    No   ----------   Medical history   Medical history data does not currently exist for this patient.

## 2023-01-20 RX ORDER — ANASTROZOLE 1 MG/1
TABLET ORAL
Qty: 90 TABLET | Refills: 0 | OUTPATIENT
Start: 2023-01-20